# Patient Record
Sex: MALE | Race: OTHER | NOT HISPANIC OR LATINO | URBAN - METROPOLITAN AREA
[De-identification: names, ages, dates, MRNs, and addresses within clinical notes are randomized per-mention and may not be internally consistent; named-entity substitution may affect disease eponyms.]

---
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
## 2022-03-08 ENCOUNTER — EMERGENCY (EMERGENCY)
Facility: HOSPITAL | Age: 22
LOS: 1 days | Discharge: HOME | End: 2022-03-08
Attending: EMERGENCY MEDICINE
Payer: COMMERCIAL

## 2022-03-08 VITALS
DIASTOLIC BLOOD PRESSURE: 91 MMHG | HEIGHT: 72 IN | TEMPERATURE: 98 F | OXYGEN SATURATION: 99 % | WEIGHT: 179.9 LBS | RESPIRATION RATE: 18 BRPM | SYSTOLIC BLOOD PRESSURE: 153 MMHG | HEART RATE: 109 BPM

## 2022-03-08 PROCEDURE — 99283 EMERGENCY DEPT VISIT LOW MDM: CPT | Mod: 25

## 2022-03-08 PROCEDURE — 73030 X-RAY EXAM OF SHOULDER: CPT

## 2022-03-08 PROCEDURE — 73030 X-RAY EXAM OF SHOULDER: CPT | Mod: 26,LT

## 2022-03-08 PROCEDURE — 99284 EMERGENCY DEPT VISIT MOD MDM: CPT

## 2022-03-08 RX ORDER — IBUPROFEN 200 MG
600 TABLET ORAL ONCE
Refills: 0 | Status: COMPLETED | OUTPATIENT
Start: 2022-03-08 | End: 2022-03-08

## 2022-03-08 RX ADMIN — Medication 600 MILLIGRAM(S): at 07:25

## 2022-03-08 NOTE — ED PROVIDER NOTE - NS ED ATTENDING STATEMENT MOD
This was a shared visit with the SHANNAN. I reviewed and verified the documentation and independently performed the documented:

## 2022-03-08 NOTE — ED PROVIDER NOTE - PATIENT PORTAL LINK FT
You can access the FollowMyHealth Patient Portal offered by [facility] by registering at the following website: [email or web address]. By joining [employer]’s FollowMyHealth portal, you will also be able to view your health information using other applications (apps) compatible with our system.

## 2022-03-08 NOTE — ED PROVIDER NOTE - CLINICAL SUMMARY MEDICAL DECISION MAKING FREE TEXT BOX
Call Center TCM Note      Responses   [facility] patient discharged from?  [facility]   Does the patient have one of the following disease processes/diagnoses(primary or secondary)?  Other   TCM attempt successful?  No   Unsuccessful attempts  Attempt 2   Call Status  Left message          Sandy Manzanares RN    3/30/2021, 16:15 EDT       L shoulder pain s/p fall while skiing: h/o multiple dislocations, self reduced as per patient, XR, pain control, f/u with ortho, NSAIDS PRN

## 2022-03-08 NOTE — ED PROVIDER NOTE - CARE PROVIDER_API CALL
Perry Kelley)  Orthopaedic Surgery  [address] [place] [address]  Phone: [phone]  Fax: [phone]  Follow Up Time:

## 2022-03-08 NOTE — ED PROVIDER NOTE - NS ED ROS FT
No fever/chills, No photophobia/eye pain/changes in vision, No ear pain/sore throat/dysphagia, No chest pain/palpitations, no SOB/cough/wheeze/stridor, No abdominal pain, No N/V/D, no dysuria/frequency/discharge, +L shoulder pain, No neck/back pain, no rash, no changes in neurological status/function.

## 2022-03-08 NOTE — ED ADULT NURSE NOTE - OBJECTIVE STATEMENT
21y male AOx4 c/o left shoulder pain s/p fall while skiing. pt states he attempted to brace himself during fall use left arm. pt denies headstrike or LOC s/p fall, denies blood thinners. no obvious deformity noted. left radial pulse palpable. respirations even and unlabored. denies chest discomfort. abd soft and nondistended. skin WNL for race.

## 2022-03-08 NOTE — ED ADULT TRIAGE NOTE - CHIEF COMPLAINT QUOTE
Ambulatory s/p dislocating shoulder yesterday while skiing but being able to "pop it back in." Patient reports moderate pain, decreased ROM of UE, + pulses. Patient has dislocated the shoulder a few times in the past.

## 2022-03-08 NOTE — ED PROVIDER NOTE - ATTENDING CONTRIBUTION TO CARE
21 year old male here c/o L shoulder pain x 1 day after skiing; pe awke alert in nad heent ncat neck supple  left  shoulder  pain with rom ; tender to palp anterior;  dx shoulder injury  xray  shoulder immobilizer; ortho follow up

## 2022-03-08 NOTE — ED PROVIDER NOTE - OBJECTIVE STATEMENT
This is a 21 year old male here c/o L shoulder pain x 1 day.  He reports had a fall yesterday while skiing and braced fall with arm.  He notes pain with movement.  He reports is R handed.  He denies any numbness or tingly sensation.  Patient denies taking any pain medication PTA.  He denies any other complaints.

## 2022-03-08 NOTE — ED PROVIDER NOTE - PHYSICAL EXAMINATION
Constitutional - well-developed; well nourished. Head - NCAT. Airway patent. Eyes - PERRL. CV - RRR. no murmur. no edema. Pulm - CTAB. Abd - soft, nt. no rebound. no guarding. Neuro - A&Ox3. strength 5/5 x4. sensation intact x4. normal gait. Skin - No rash. MSK - +TTP along AC region, LROM, radial pulse intact, skin warm and dry.

## 2022-03-18 ENCOUNTER — APPOINTMENT (OUTPATIENT)
Dept: ORTHOPEDIC SURGERY | Facility: CLINIC | Age: 22
End: 2022-03-18
